# Patient Record
(demographics unavailable — no encounter records)

---

## 2025-02-25 NOTE — ASSESSMENT
[FreeTextEntry1] : This young man comes today accompanied by his father regarding the chief complaint of leg length inequality, right shorter than left.  Today's visit lasted for approximately 30 minutes with time spent discussing possible surgical treatment with intramedullary nail lengthening.  This is time independent of education, teaching and other reported services.   INTERVAL HISTORY:  Hill comes today for further assessment regarding his leg length inequality with a leg length film given the fact that he is continuing to grow to assess if he would reach a number, which was clinically significant.  Hill reports that he was using a shoe lift.  He was having some difficulty and pain within his knees as he is an active athlete.  The patient comes today for clinical and radiographic reassessment.  The patient does not feel that there has been any acute increase in leg length inequality and also inquires as to how much more growth he has left.   Since the day of the last evaluation, there has been no significant change in past medical or social history.   Review of systems today is negative for fever, chills, chest pain, shortness of breath, or rashes.   PHYSICAL EXAMINATION: On physical examination today, Hill is in no apparent distress.  He is pleasant, cooperative and alert, appropriate for age.  He has no evidence of limp or antalgia with gait.  Supine examination does confirm the presence of leg length inequality approaching 1 inch.  The patient has normal range of motion about the ankles, knees and hips with 5/5 motor strength tested through the lower extremities.   X-ray imaging that was obtained today, standing leg length films with a 1 inch block placed under the right foot indicated an overcorrection of approximately 2 to 3 mm, which would indicate a leg length inequality between 2.2 and 2.3 cm with the right side being short.  Bone age films were also obtained today indicating a small patency of the distal radial and ulnar physis progressing towards a physeal scar.   ASSESSMENT/PLAN: Hill is a 15-year-old young man who has leg length inequality, which is approximately 2.2 to 2.3 cm with the right side being short.  Today, I had an extensive discussion reviewing the x-ray imaging with Hill's father who act as independent historian given the child's pediatric age.  I reviewed the fact that he is near skeletal maturity and I do not feel that there will be any further significant increases in the curve given the fact that his distal radial and ulnar physis are now beginning to close.  At this point, given the leg length inequality, a shoe lift is certainly an option.  This does not necessarily meet direct clinical indications for magnetic lengthening, although this is an option moving forward if Hill should feel that he is out of balance or he is having issues with his knees, hips or low back.  The literature is quite mixed as to what the true leg length inequality that would necessitate a lengthening would be, although there is more generalized agreement at 3 and certainly at 4 cm of leg length.  Given the fact that his growth plates are for the most part coming to a close, there would be no significant limitation as to the timing as to whether or not he would have with respect to making a decision on magnetic lengthening.  At this point, I would not recommend an aggressive treatment option and the family is on board with this.  All questions were answered to satisfaction today.  If he should be having complaints of pain or difficulty, I would be more than happy to see him back for further assessment; otherwise, at this point, I will transition care to follow up on an as-needed basis.